# Patient Record
Sex: FEMALE | Race: WHITE | NOT HISPANIC OR LATINO | URBAN - NONMETROPOLITAN AREA
[De-identification: names, ages, dates, MRNs, and addresses within clinical notes are randomized per-mention and may not be internally consistent; named-entity substitution may affect disease eponyms.]

---

## 2017-06-09 ENCOUNTER — IMPORTED ENCOUNTER (OUTPATIENT)
Dept: URBAN - NONMETROPOLITAN AREA CLINIC 1 | Facility: CLINIC | Age: 67
End: 2017-06-09

## 2017-06-09 PROBLEM — H43.813: Noted: 2017-06-09

## 2017-06-09 PROBLEM — H25.13: Noted: 2017-06-09

## 2017-06-09 PROBLEM — H52.13: Noted: 2017-06-09

## 2017-06-09 PROBLEM — H35.3131: Noted: 2017-06-09

## 2017-06-09 PROBLEM — H52.4: Noted: 2017-06-09

## 2017-06-09 PROCEDURE — 92250 FUNDUS PHOTOGRAPHY W/I&R: CPT

## 2017-06-09 PROCEDURE — 92015 DETERMINE REFRACTIVE STATE: CPT

## 2017-06-09 PROCEDURE — 99214 OFFICE O/P EST MOD 30 MIN: CPT

## 2017-06-09 NOTE — PATIENT DISCUSSION
ARMD Dry OD>OSDiscussed diagnosis with patientNegative family history of diseaseDiscussed the chronic nature of this disease and limited treatment optionsOptos done today: drusen noted OD Recommend taking eye vitamins daily and monitoring vision for changes with [employer] patient to call or come in ASAP if any changes in vision noted from todayContinue to [facility] OUDiscussed diagnosis with patientReviewed symptoms related to cataract progressionDiscussed various treatment options with patientPatient defers treatment at this timeContinue to monitorPVD OUDiscussed findings of exam in detail with the patientThe risk of retinal detachment in patients with PVDs was discussed with the patient and the warning signs of retinal detachment were carefully reviewed with the patientThe patient was warned to return to the office or contact the ophthalmologist on call immediately if they experience signs of retinal detachmentContinue to monitorMyopia/ Astigmatism/ Presbyopia OUDiscussed refractive status with patientNew glasses Rx given todayContinue to monitor; 's Notes: MR 6/9/17DFE 6/9/17Optos 6/9/17OCT

## 2017-12-15 ENCOUNTER — IMPORTED ENCOUNTER (OUTPATIENT)
Dept: URBAN - NONMETROPOLITAN AREA CLINIC 1 | Facility: CLINIC | Age: 67
End: 2017-12-15

## 2017-12-15 PROCEDURE — 99214 OFFICE O/P EST MOD 30 MIN: CPT

## 2017-12-15 PROCEDURE — 92134 CPTRZ OPH DX IMG PST SGM RTA: CPT

## 2017-12-15 NOTE — PATIENT DISCUSSION
ARMD Dry OD>OSDiscussed diagnosis with patientNegative family history of diseaseDiscussed the chronic nature of this disease and limited treatment optionsOCT done today: no subretinal fluid OU  Recommend taking eye vitamins daily and monitoring vision for changes with [address] [facility] patient to call or come in ASAP if any changes in vision noted from todayContinue to [facility] OUDiscussed diagnosis with patientReviewed symptoms related to cataract progressionDiscussed various treatment options with patientPatient defers treatment at this timeContinue to monitorPVD OUDiscussed findings of exam in detail with the patientThe risk of retinal detachment in patients with PVDs was discussed with the patient and the warning signs of retinal detachment were carefully reviewed with the patientThe patient was warned to return to the office or contact the ophthalmologist on call immediately if they experience signs of retinal detachmentContinue to monitorMyopia/ Astigmatism/ Presbyopia OUDiscussed refractive status with patientContinue to monitorContinue to monitor; 's Notes: MR 6/9/17DFE 12/15/17Optos 6/9/17OCT 12/15/17

## 2018-06-15 ENCOUNTER — IMPORTED ENCOUNTER (OUTPATIENT)
Dept: URBAN - NONMETROPOLITAN AREA CLINIC 1 | Facility: CLINIC | Age: 68
End: 2018-06-15

## 2018-06-15 PROCEDURE — 92015 DETERMINE REFRACTIVE STATE: CPT

## 2018-06-15 PROCEDURE — 92014 COMPRE OPH EXAM EST PT 1/>: CPT

## 2018-06-15 PROCEDURE — 92250 FUNDUS PHOTOGRAPHY W/I&R: CPT

## 2018-06-15 NOTE — PATIENT DISCUSSION
ARMD Dry OD>OSDiscussed diagnosis with patientNegative family history of diseaseDiscussed the chronic nature of this disease and limited treatment optionsOptos performed today: shows dry ARMD OU Recommend taking eye vitamins daily and monitoring vision for changes with [employer] patient to call or come in ASAP if any changes in vision noted from todayContinue to [facility] OUDiscussed diagnosis with patientReviewed symptoms related to cataract progressionDiscussed various treatment options with patientPatient defers treatment at this timeContinue to monitorPVD OUDiscussed findings of exam in detail with the patientThe risk of retinal detachment in patients with PVDs was discussed with the patient and the warning signs of retinal detachment were carefully reviewed with the patientThe patient was warned to return to the office or contact the ophthalmologist on call immediately if they experience signs of retinal detachmentContinue to monitorMyopia/ Astigmatism/ Presbyopia OUDiscussed refractive status with patientNew glasses Rx given todayContinue to monitor; 's Notes: MR 6/15/18DFE 6/15/18Optos 6/15/18OCT 12/15/17

## 2018-12-17 ENCOUNTER — IMPORTED ENCOUNTER (OUTPATIENT)
Dept: URBAN - NONMETROPOLITAN AREA CLINIC 1 | Facility: CLINIC | Age: 68
End: 2018-12-17

## 2018-12-17 PROCEDURE — 99214 OFFICE O/P EST MOD 30 MIN: CPT

## 2018-12-17 PROCEDURE — 92134 CPTRZ OPH DX IMG PST SGM RTA: CPT

## 2018-12-17 NOTE — PATIENT DISCUSSION
ARMD Dry OD>OSDiscussed diagnosis with patientNegative family history of diseaseDiscussed the chronic nature of this disease and limited treatment optionsOCT done today; no subretinal fluid OU Recommend taking eye vitamins daily and monitoring vision for changes with [address] [facility] patient to call or come in ASAP if any changes in vision noted from todayContinue to [facility] in 6 months with [facility] OUDiscussed diagnosis with patient. Reviewed symptoms related to cataract progression. Discussed various treatment options with patient. No treatment required at this time. Continue to monitor. PVD OUDiscussed findings of exam in detail with the patient. The risk of retinal detachment in patients with PVDs was discussed with the patient and the warning signs of retinal detachment were carefully reviewed with the patient. The patient was warned to return to the office or contact the ophthalmologist on call immediately if they experience signs of retinal detachment. Continue to monitor. Myopia/ Astigmatism/ Presbyopia OUDiscussed refractive status with patientContinue to monitor; 's Notes: MR 6/15/18DFE 12/17/18Optos 6/15/18OCT 12/17/18

## 2019-07-05 ENCOUNTER — IMPORTED ENCOUNTER (OUTPATIENT)
Dept: URBAN - NONMETROPOLITAN AREA CLINIC 1 | Facility: CLINIC | Age: 69
End: 2019-07-05

## 2019-07-05 PROCEDURE — 92250 FUNDUS PHOTOGRAPHY W/I&R: CPT

## 2019-07-05 PROCEDURE — 92015 DETERMINE REFRACTIVE STATE: CPT

## 2019-07-05 PROCEDURE — 92014 COMPRE OPH EXAM EST PT 1/>: CPT

## 2019-07-05 NOTE — PATIENT DISCUSSION
ARMD Dry OD>OSDiscussed diagnosis with patientNegative family history of diseaseDiscussed the chronic nature of this disease and limited treatment optionsOptos done today stable findings of drusen and RPE changes OU. Recommend taking eye vitamins daily and monitoring vision for changes with [employer] patient to call or come in ASAP if any changes in vision noted from todayContinue to monitor every 6 months OCT next [address] OUDiscussed diagnosis with patient. Reviewed symptoms related to cataract progression. Discussed various treatment options with patient. No treatment required at this time. Continue to monitor. PVD OUDiscussed findings of exam in detail with the patient. The risk of retinal detachment in patients with PVDs was discussed with the patient and the warning signs of retinal detachment were carefully reviewed with the patient. The patient was warned to return to the office or contact the ophthalmologist on call immediately if they experience signs of retinal detachment or changes in vision noted from today. Continue to monitor. Myopia/ Astigmatism/ Presbyopia OUDiscussed refractive status with patientMR done new GLRx givenContinue to monitor; 's Notes: MR 7/5/19DFE  7/5/19Optos 7/5/19OCT 12/17/18

## 2019-08-22 NOTE — PROCEDURE NOTE: SURGICAL
<p>Prior to commencing surgery patient identification, surgical procedure, site, and side were confirmed by Dr. Osmin Juares. Following topical proparacaine anesthesia, the patient was positioned at the YAG laser, a contact lens coupled to the cornea with methylcellulose and an axial posterior capsulotomy performed without complication using 2.5 Mj x 41. Excess methylcellulose was washed from the eye, one drop of Alphagan was instilled and the patient returned to the holding area having tolerated the procedure well and without complication. </p><p>MRN:[id number]</p><p>&nbsp;</p>

## 2020-01-06 ENCOUNTER — IMPORTED ENCOUNTER (OUTPATIENT)
Dept: URBAN - NONMETROPOLITAN AREA CLINIC 1 | Facility: CLINIC | Age: 70
End: 2020-01-06

## 2020-01-06 PROCEDURE — 92134 CPTRZ OPH DX IMG PST SGM RTA: CPT

## 2020-01-06 PROCEDURE — 99214 OFFICE O/P EST MOD 30 MIN: CPT

## 2020-01-06 NOTE — PATIENT DISCUSSION
ARMD Dry OD>OSDiscussed diagnosis with patientNegative family history of diseaseDiscussed the chronic nature of this disease and limited treatment optionsOCT done today; stable with no subretinal fluid OU. Continue taking eye vitamins daily and monitoring vision for changes with [employer] patient to call or come in ASAP if any changes in vision noted from todayContinue to monitor. RTC in 6 months with [facility] OUDiscussed diagnosis with patient. Reviewed symptoms related to cataract progression. Discussed various treatment options with patient. No treatment required at this time. Continue to monitor. PVD OUDiscussed findings of exam in detail with the patient. The risk of retinal detachment in patients with PVDs was discussed with the patient and the warning signs of retinal detachment were carefully reviewed with the patient. The patient was warned to return to the office or contact the ophthalmologist on call immediately if they experience signs of retinal detachment or changes in vision noted from today. Continue to monitor. Myopia/ Astigmatism/ Presbyopia OUDiscussed refractive status with patientContinue to monitor; 's Notes: MR 7/5/19DFE  1/6/20Optos 7/5/19OCT 1/6/20

## 2020-07-06 ENCOUNTER — IMPORTED ENCOUNTER (OUTPATIENT)
Dept: URBAN - NONMETROPOLITAN AREA CLINIC 1 | Facility: CLINIC | Age: 70
End: 2020-07-06

## 2020-07-06 PROCEDURE — 92015 DETERMINE REFRACTIVE STATE: CPT

## 2020-07-06 PROCEDURE — 92014 COMPRE OPH EXAM EST PT 1/>: CPT

## 2020-07-06 PROCEDURE — 92250 FUNDUS PHOTOGRAPHY W/I&R: CPT

## 2020-07-06 NOTE — PATIENT DISCUSSION
ARMD Dry OD>OSDiscussed diagnosis with patientNegative family history of diseaseDiscussed the chronic nature of this disease and limited treatment optionsOptos done today; stable with drusen OU. Continue taking eye vitamins daily and monitoring vision for changes with [employer] patient to call or come in ASAP if any changes in vision noted from todayContinue to monitor. RTC in 6 months with [facility] OUDiscussed diagnosis with patient. Reviewed symptoms related to cataract progression. Discussed various treatment options with patient. No treatment required at this time. Continue to monitor. PVD OUDiscussed findings of exam in detail with the patient. The risk of retinal detachment in patients with PVDs was discussed with the patient and the warning signs of retinal detachment were carefully reviewed with the patient. The patient was warned to return to the office or contact the ophthalmologist on call immediately if they experience signs of retinal detachment or changes in vision noted from today. Continue to monitor. Myopia/ Astigmatism/ Presbyopia OUDiscussed refractive status with patient. New glasses Rx given today. Continue to monitor.; 's Notes: MR 7/6/20DFE  7/6/20Optos 7/6/20OCT 1/6/20

## 2021-01-08 ENCOUNTER — IMPORTED ENCOUNTER (OUTPATIENT)
Dept: URBAN - NONMETROPOLITAN AREA CLINIC 1 | Facility: CLINIC | Age: 71
End: 2021-01-08

## 2021-01-08 PROCEDURE — 99214 OFFICE O/P EST MOD 30 MIN: CPT

## 2021-01-08 PROCEDURE — 92134 CPTRZ OPH DX IMG PST SGM RTA: CPT

## 2021-01-08 NOTE — PATIENT DISCUSSION
ARMD Dry OD>OSDiscussed diagnosis with patientNegative family history of diseaseDiscussed the chronic nature of this disease and limited treatment optionsOCT done today; stable with no subretinal fluid OU. Continue taking eye vitamins daily and monitoring vision for changes with [employer] patient to call or come in ASAP if any changes in vision noted from todayContinue to monitor. RTC in 6 months with [address] OUDiscussed diagnosis with patient. Reviewed symptoms related to cataract progression. Discussed various treatment options with patient. No treatment required at this time. Continue to monitor. PVD OUDiscussed findings of exam in detail with the patient. The risk of retinal detachment in patients with PVDs was discussed with the patient and the warning signs of retinal detachment were carefully reviewed with the patient. The patient was warned to return to the office or contact the ophthalmologist on call immediately if they experience signs of retinal detachment or changes in vision noted from today. Continue to monitor. Myopia/ Astigmatism/ Presbyopia OUDiscussed refractive status with patient. Continue to monitor.; 's Notes: MR 7/6/20DFE  1/8/21Optos 7/6/20OCT 1/8/21

## 2021-07-09 ENCOUNTER — IMPORTED ENCOUNTER (OUTPATIENT)
Dept: URBAN - NONMETROPOLITAN AREA CLINIC 1 | Facility: CLINIC | Age: 71
End: 2021-07-09

## 2021-07-09 PROCEDURE — 92015 DETERMINE REFRACTIVE STATE: CPT

## 2021-07-09 PROCEDURE — 92014 COMPRE OPH EXAM EST PT 1/>: CPT

## 2021-07-09 PROCEDURE — 92250 FUNDUS PHOTOGRAPHY W/I&R: CPT

## 2021-07-09 NOTE — PATIENT DISCUSSION
ARMD Dry OD>OSDiscussed diagnosis with patient. Negative family history of disease. Discussed the chronic nature of this disease and limited treatment options. Optos done today; stable from preivous with drusen OU. OCT done previously; stable with no subretinal fluid OU. Continue taking eye vitamins daily and monitoring vision for changes with [employer] patient to call or come in ASAP if any changes in vision noted from today. Continue to monitor. RTC in 6 months with [address] OUDiscussed diagnosis with patient. Reviewed symptoms related to cataract progression. Discussed various treatment options with patient. No treatment required at this time. Continue to monitor. PVD OUDiscussed findings of exam in detail with the patient. The risk of retinal detachment in patients with PVDs was discussed with the patient and the warning signs of retinal detachment were carefully reviewed with the patient. The patient was warned to return to the office or contact the ophthalmologist on call immediately if they experience signs of retinal detachment or changes in vision noted from today. Continue to monitor. Myopia/ Astigmatism/ Presbyopia OUDiscussed refractive status with patient. New glasses Rx given today.  Continue to monitor.; 's Notes: MR  7/9/21DFE  7/9/21Optos 7/9/21OCT 1/8/21

## 2022-02-12 ASSESSMENT — VISUAL ACUITY
OD_SC: 20/20
OS_SC: 20/30-
OS_CC: J1
OD_SC: 20/30+
OS_SC: 20/25-
OD_CC: J1
OS_SC: 20/30-
OS_SC: 20/30+
OD_PH: 20/20
OD_SC: 20/30
OU_CC: J1+
OD_SC: 20/40-

## 2022-02-12 ASSESSMENT — TONOMETRY
OS_IOP_MMHG: 11
OS_IOP_MMHG: 11
OD_IOP_MMHG: 12
OD_IOP_MMHG: 11
OS_IOP_MMHG: 12
OS_IOP_MMHG: 12
OD_IOP_MMHG: 12
OD_IOP_MMHG: 12

## 2022-02-14 ENCOUNTER — FOLLOW UP (OUTPATIENT)
Dept: URBAN - NONMETROPOLITAN AREA CLINIC 1 | Facility: CLINIC | Age: 72
End: 2022-02-14

## 2022-02-14 DIAGNOSIS — H35.3131: ICD-10-CM

## 2022-02-14 PROCEDURE — 92134 CPTRZ OPH DX IMG PST SGM RTA: CPT

## 2022-02-14 PROCEDURE — 99214 OFFICE O/P EST MOD 30 MIN: CPT

## 2022-02-14 ASSESSMENT — TONOMETRY
OS_IOP_MMHG: 13
OD_IOP_MMHG: 13

## 2022-02-14 ASSESSMENT — VISUAL ACUITY
OS_CC: 20/32-
OD_CC: 20/29-

## 2022-04-10 ASSESSMENT — VISUAL ACUITY
OD_SC: 20/30-
OD_SC: 20/20-1
OD_SC: 20/30+
OS_SC: 20/40-
OS_PH: 20/25
OS_SC: 20/30-
OD_SC: 20/30
OS_CC: 20/40
OD_CC: 20/50
OS_SC: 20/40
OS_SC: 20/30-1

## 2022-04-10 ASSESSMENT — TONOMETRY
OS_IOP_MMHG: 11
OS_IOP_MMHG: 13
OS_IOP_MMHG: 13
OD_IOP_MMHG: 11
OD_IOP_MMHG: 14
OD_IOP_MMHG: 15
OD_IOP_MMHG: 13
OD_IOP_MMHG: 14
OS_IOP_MMHG: 14
OS_IOP_MMHG: 14

## 2022-08-15 ENCOUNTER — COMPREHENSIVE EXAM (OUTPATIENT)
Dept: URBAN - NONMETROPOLITAN AREA CLINIC 1 | Facility: CLINIC | Age: 72
End: 2022-08-15

## 2022-08-15 DIAGNOSIS — H35.3131: ICD-10-CM

## 2022-08-15 DIAGNOSIS — H52.13: ICD-10-CM

## 2022-08-15 DIAGNOSIS — H43.813: ICD-10-CM

## 2022-08-15 DIAGNOSIS — H25.13: ICD-10-CM

## 2022-08-15 PROCEDURE — 92250 FUNDUS PHOTOGRAPHY W/I&R: CPT

## 2022-08-15 PROCEDURE — 99214 OFFICE O/P EST MOD 30 MIN: CPT

## 2022-08-15 PROCEDURE — 92015 DETERMINE REFRACTIVE STATE: CPT

## 2022-08-15 ASSESSMENT — TONOMETRY
OD_IOP_MMHG: 12
OS_IOP_MMHG: 12

## 2022-08-15 ASSESSMENT — VISUAL ACUITY
OS_CC: 20/30-1
OD_CC: 20/25

## 2023-08-28 ENCOUNTER — FOLLOW UP (OUTPATIENT)
Dept: URBAN - NONMETROPOLITAN AREA CLINIC 1 | Facility: CLINIC | Age: 73
End: 2023-08-28

## 2023-08-28 DIAGNOSIS — H43.813: ICD-10-CM

## 2023-08-28 DIAGNOSIS — H52.4: ICD-10-CM

## 2023-08-28 DIAGNOSIS — H35.3131: ICD-10-CM

## 2023-08-28 DIAGNOSIS — H25.13: ICD-10-CM

## 2023-08-28 PROCEDURE — 92015 DETERMINE REFRACTIVE STATE: CPT

## 2023-08-28 PROCEDURE — 92250 FUNDUS PHOTOGRAPHY W/I&R: CPT

## 2023-08-28 PROCEDURE — 99214 OFFICE O/P EST MOD 30 MIN: CPT

## 2023-08-28 ASSESSMENT — VISUAL ACUITY
OS_CC: 20/40-1
OS_PH: 20/32
OU_CC: 20/25-1
OD_CC: 20/29
OD_BAT: 20/50
OS_BAT: 20/100

## 2023-08-28 ASSESSMENT — TONOMETRY
OD_IOP_MMHG: 15
OS_IOP_MMHG: 15

## 2024-02-29 ENCOUNTER — FOLLOW UP (OUTPATIENT)
Dept: URBAN - NONMETROPOLITAN AREA CLINIC 1 | Facility: CLINIC | Age: 74
End: 2024-02-29

## 2024-02-29 DIAGNOSIS — H35.3131: ICD-10-CM

## 2024-02-29 PROCEDURE — 99214 OFFICE O/P EST MOD 30 MIN: CPT

## 2024-02-29 PROCEDURE — 92134 CPTRZ OPH DX IMG PST SGM RTA: CPT

## 2024-02-29 ASSESSMENT — VISUAL ACUITY
OS_PH: 20/32
OS_CC: 20/40-1
OD_CC: 20/29

## 2024-02-29 ASSESSMENT — TONOMETRY
OS_IOP_MMHG: 13
OD_IOP_MMHG: 13

## 2025-06-20 NOTE — PATIENT DISCUSSION
(Observe) Observe for now without intervention. The patient was advised to contact office with any change or worsening of vision.
(Without Tear) No holes, tears or detachments. Patient cautioned to call our office immediately if they experience a substantial change in their symptoms such as an increase in floaters, persistent flashes, loss of visual field (may appear as a shadow or a curtain) or decrease in visual acuity as these may indicate a retinal tear or detachment.
Importance of daily AREDS II study multivitamin and [employer] checks discussed with patient. Patient to follow-up immediately with any new onset of decreased vision and/or metamorphopsia.
Patient advised to call if any problems, questions, or concerns.
Patient given Rx for glasses.
Patient understands condition, prognosis and need for follow up care.
Haris Rascon  (son)